# Patient Record
Sex: MALE | Race: WHITE
[De-identification: names, ages, dates, MRNs, and addresses within clinical notes are randomized per-mention and may not be internally consistent; named-entity substitution may affect disease eponyms.]

---

## 2018-01-09 ENCOUNTER — HOSPITAL ENCOUNTER (OUTPATIENT)
Dept: HOSPITAL 45 - C.MAMM | Age: 23
Discharge: HOME | End: 2018-01-09
Payer: COMMERCIAL

## 2018-01-09 DIAGNOSIS — R63.0: Primary | ICD-10-CM

## 2018-07-20 ENCOUNTER — HOSPITAL ENCOUNTER (OUTPATIENT)
Dept: HOSPITAL 45 - C.CPL | Age: 23
Discharge: HOME | End: 2018-07-20
Payer: OTHER GOVERNMENT

## 2018-07-20 DIAGNOSIS — R00.1: ICD-10-CM

## 2018-07-20 DIAGNOSIS — R63.0: Primary | ICD-10-CM

## 2022-09-14 NOTE — ECHOCARDIOGRAM REPORT
*NOTICE TO RECEIVING PARTY AGENCY**  This information is strictly Confidential and protected under 
[place] law.  [place] law prohibits you from making any further disclosure of this 
information unless further disclosure is expressly permitted by the written consent of the person to 
whom it pertains or is authorized by law.  A general authorization for the release of medical or 
other information is not sufficient for this purpose.  Hospital accepts no responsibility if the 
information is made available to any other person, INCLUDING THE PATIENT.



Interpretation Summary

  *  Name: JONATHAN BUNEROSTRO  Study Date: 2018 01:26 PM  BP: 146/52 mmHg

  *  MRN: [id number]  Patient Location: [facility]  HR: 50

  *  : 1995 (M/d/yyyy)  Gender: Male  Height: 72 in

  *  Age: 22 yrs  Ethnicity: [place]  Weight: 175 lb

  *  Ordering Physician: Linda Peng

  *  Referring Physician: Linda Peng

  *  Performed By: Tonja Nogueira RDCS

  *  Accession# [id number]  Account# [id number]

  *  Reason For Study: Anorexia, bradycardia

  *  BSA: 2.0 m2

  *  -- Conclusions --

  *  The left ventricle is mildly dilated.

  *  Left ventricular systolic function is normal.

  *  Normal diastolic function

  *  Right ventricular systolic pressure is normal.

Procedure Details

  *  A complete two-dimensional transthoracic echocardiogram was performed (2D, M-mode, Doppler and 
color flow Doppler).

Left Ventricle

  *  The left ventricle is mildly dilated.

  *  There is normal left ventricular wall thickness.

  *  Ejection Fraction = 60-65%.

  *  Left ventricular systolic function is normal.

  *  Normal diastolic function

  *  The left ventricular wall motion is normal.

Right Ventricle

  *  The right ventricle is normal in size and function.

Atria

  *  The left atrial size is normal.

  *  Right atrial size is normal.

Mitral Valve

  *  The mitral valve anatomy is normal.

  *  Significant mitral regurgitation is absent.

Tricuspid Valve

  *  The tricuspid valve is not well visualized, but is grossly normal.

  *  There is trace tricuspid regurgitation.

  *  Right ventricular systolic pressure is normal.

Aortic Valve

  *  The aortic valve is normal in structure and function.

  *  The aortic valve is trileaflet.

  *  No hemodynamically significant valvular aortic stenosis.

  *  There is no significant aortic regurgitation.

Pulmonic Valve

  *  The pulmonic valve is not well seen, but is grossly normal.

  *  Trace pulmonic valvular regurgitation.

Great Vessels

  *  The aortic root is normal size.

Pericardium/Pleural

  *  There is no pericardial effusion.



MMode 2D Measurements and Calculations

IVSd 0.83 cm



LVIDd 5.9 cm

LVIDs 3.7 cm

LVPWd 0.79 cm



IVS/LVPW 1.1 

FS 36.6 %

EDV(Teich) 172.6 ml

ESV(Teich) 59.4 ml

EF(Teich) 65.6 %



EDV(cubed) 204.5 ml

ESV(cubed) 52.0 ml

EF(cubed) 74.6 %





LV mass(C)d 183.7 grams

LV mass(C)dI 91.3 grams/m\S\2



SV(Teich) 113.2 ml

SI(Teich) 56.3 ml/m\S\2

SV(cubed) 152.4 ml

SI(cubed) 75.7 ml/m\S\2



Ao root diam 3.0 cm

Ao root area 6.9 cm\S\2

ACS 2.3 cm

LA dimension 2.8 cm



asc Aorta Diam 2.6 cm





LA/Ao 0.93 

LVOT diam 2.0 cm

LVOT area 3.2 cm\S\2



LVAd ap4 38.2 cm\S\2

LVLd ap4 8.7 cm

EDV(MOD-sp4) 137.5 ml

EDV(sp4-el) 142.3 ml

LVAs ap4 21.9 cm\S\2

LVLs ap4 6.9 cm

ESV(MOD-sp4) 58.4 ml

ESV(sp4-el) 58.7 ml

EF(MOD-sp4) 57.6 %

EF(sp4-el) 58.8 %



LVAd ap2 39.5 cm\S\2

LVLd ap2 9.8 cm

EDV(MOD-sp2) 131.6 ml

EDV(sp2-el) 134.9 ml

LVAs ap2 22.4 cm\S\2

LVLs ap2 8.0 cm

ESV(MOD-sp2) 53.2 ml

ESV(sp2-el) 53.4 ml

EF(MOD-sp2) 59.6 %

EF(sp2-el) 60.4 %



LVLd %diff 11.4 %

EDV(MOD-bp) 143.2 ml

LVLs %diff 13.2 %

ESV(MOD-bp) 60.1 ml

EF(MOD-bp) 58.0 %





SV(MOD-sp4) 79.1 ml

SI(MOD-sp4) 39.3 ml/m\S\2



SV(MOD-sp2) 78.4 ml

SI(MOD-sp2) 38.9 ml/m\S\2



SV(MOD-bp) 83.1 ml

SI(MOD-bp) 41.3 ml/m\S\2



SV(sp4-el) 83.6 ml

SI(sp4-el) 41.6 ml/m\S\2





SV(sp2-el) 81.5 ml

SI(sp2-el) 40.5 ml/m\S\2













Doppler Measurements and Calculations

MV E max mirlande 89.7 cm/sec

MV A max mirlande 35.4 cm/sec



MV E/A 2.5 



MV dec time 0.38 sec



Ao V2 max 158.8 cm/sec

Ao max PG 10.1 mmHg

Ao max PG (full) 4.9 mmHg

LESLY(V,A) 2.3 cm\S\2

LESLY(V,D) 2.3 cm\S\2





LV V1 max PG 5.2 mmHg



LV V1 max 114.0 cm/sec



PA V2 max 119.4 cm/sec

PA max PG 5.7 mmHg

PA acc slope 437.4 cm/sec\S\2

PA acc time 0.20 sec



PI max mirlande 124.9 cm/sec

PI max PG 6.2 mmHg

PI dec slope 79.1 cm/sec\S\2

PI P1/2t 462.5 msec





TR max mirlande 135.1 cm/sec



PA pr(Accel) -11.32 mmHg Libtayo Pregnancy And Lactation Text: This medication is contraindicated in pregnancy and when breast feeding.